# Patient Record
Sex: FEMALE | Race: BLACK OR AFRICAN AMERICAN | URBAN - METROPOLITAN AREA
[De-identification: names, ages, dates, MRNs, and addresses within clinical notes are randomized per-mention and may not be internally consistent; named-entity substitution may affect disease eponyms.]

---

## 2017-07-11 ENCOUNTER — COMMUNICATION - HEALTHEAST (OUTPATIENT)
Dept: BEHAVIORAL HEALTH | Facility: CLINIC | Age: 32
End: 2017-07-11

## 2017-07-11 DIAGNOSIS — F31.9 BIPOLAR AFFECTIVE DISORDER, REMISSION STATUS UNSPECIFIED (H): ICD-10-CM

## 2017-07-13 ENCOUNTER — AMBULATORY - HEALTHEAST (OUTPATIENT)
Dept: BEHAVIORAL HEALTH | Facility: CLINIC | Age: 32
End: 2017-07-13

## 2017-07-13 ENCOUNTER — OFFICE VISIT - HEALTHEAST (OUTPATIENT)
Dept: BEHAVIORAL HEALTH | Facility: CLINIC | Age: 32
End: 2017-07-13

## 2017-07-13 DIAGNOSIS — F11.29 OPIOID DEPENDENCE WITH OPIOID-INDUCED DISORDER (H): ICD-10-CM

## 2017-07-13 DIAGNOSIS — F11.10 OPIATE ABUSE, CONTINUOUS (H): ICD-10-CM

## 2017-07-13 DIAGNOSIS — F31.9 BIPOLAR AFFECTIVE DISORDER, REMISSION STATUS UNSPECIFIED (H): ICD-10-CM

## 2017-07-13 RX ORDER — DIPHENHYDRAMINE HCL 25 MG
25 CAPSULE ORAL EVERY 4 HOURS PRN
Status: SHIPPED | COMMUNITY
Start: 2017-07-13

## 2017-07-13 ASSESSMENT — MIFFLIN-ST. JEOR: SCORE: 1671.98

## 2017-07-21 ENCOUNTER — OFFICE VISIT - HEALTHEAST (OUTPATIENT)
Dept: BEHAVIORAL HEALTH | Facility: CLINIC | Age: 32
End: 2017-07-21

## 2017-07-21 DIAGNOSIS — F11.29 OPIOID DEPENDENCE WITH OPIOID-INDUCED DISORDER (H): ICD-10-CM

## 2017-07-21 DIAGNOSIS — F11.10 OPIATE ABUSE, CONTINUOUS (H): ICD-10-CM

## 2017-07-21 ASSESSMENT — MIFFLIN-ST. JEOR: SCORE: 1653.84

## 2017-07-27 ENCOUNTER — OFFICE VISIT - HEALTHEAST (OUTPATIENT)
Dept: BEHAVIORAL HEALTH | Facility: CLINIC | Age: 32
End: 2017-07-27

## 2017-07-27 DIAGNOSIS — F11.29 OPIOID DEPENDENCE WITH OPIOID-INDUCED DISORDER (H): ICD-10-CM

## 2017-07-27 DIAGNOSIS — F11.10 OPIATE ABUSE, CONTINUOUS (H): ICD-10-CM

## 2017-07-27 ASSESSMENT — MIFFLIN-ST. JEOR: SCORE: 1662.91

## 2017-08-02 ENCOUNTER — AMBULATORY - HEALTHEAST (OUTPATIENT)
Dept: BEHAVIORAL HEALTH | Facility: CLINIC | Age: 32
End: 2017-08-02

## 2017-08-02 DIAGNOSIS — F11.29 OPIOID DEPENDENCE WITH OPIOID-INDUCED DISORDER (H): ICD-10-CM

## 2017-08-02 DIAGNOSIS — F11.10 OPIATE ABUSE, CONTINUOUS (H): ICD-10-CM

## 2017-08-02 ASSESSMENT — MIFFLIN-ST. JEOR: SCORE: 1653.84

## 2017-08-07 ENCOUNTER — COMMUNICATION - HEALTHEAST (OUTPATIENT)
Dept: BEHAVIORAL HEALTH | Facility: CLINIC | Age: 32
End: 2017-08-07

## 2017-08-07 DIAGNOSIS — F31.9 BIPOLAR AFFECTIVE DISORDER, REMISSION STATUS UNSPECIFIED (H): ICD-10-CM

## 2017-08-09 ENCOUNTER — AMBULATORY - HEALTHEAST (OUTPATIENT)
Dept: BEHAVIORAL HEALTH | Facility: CLINIC | Age: 32
End: 2017-08-09

## 2017-08-09 DIAGNOSIS — F11.29 OPIOID DEPENDENCE WITH OPIOID-INDUCED DISORDER (H): ICD-10-CM

## 2017-08-09 DIAGNOSIS — F11.10 OPIATE ABUSE, CONTINUOUS (H): ICD-10-CM

## 2017-08-09 ASSESSMENT — MIFFLIN-ST. JEOR: SCORE: 1667.45

## 2017-08-16 ENCOUNTER — COMMUNICATION - HEALTHEAST (OUTPATIENT)
Dept: BEHAVIORAL HEALTH | Facility: CLINIC | Age: 32
End: 2017-08-16

## 2017-08-16 DIAGNOSIS — F31.9 BIPOLAR AFFECTIVE DISORDER, REMISSION STATUS UNSPECIFIED (H): ICD-10-CM

## 2017-08-16 RX ORDER — GABAPENTIN 300 MG/1
CAPSULE ORAL
Qty: 180 CAPSULE | Refills: 0 | Status: SHIPPED | OUTPATIENT
Start: 2017-08-16

## 2017-08-24 ENCOUNTER — AMBULATORY - HEALTHEAST (OUTPATIENT)
Dept: BEHAVIORAL HEALTH | Facility: CLINIC | Age: 32
End: 2017-08-24

## 2017-08-24 DIAGNOSIS — F11.10 OPIATE ABUSE, CONTINUOUS (H): ICD-10-CM

## 2017-08-24 DIAGNOSIS — F11.29 OPIOID DEPENDENCE WITH OPIOID-INDUCED DISORDER (H): ICD-10-CM

## 2017-08-24 RX ORDER — BUPRENORPHINE AND NALOXONE 8; 2 MG/1; MG/1
FILM, SOLUBLE BUCCAL; SUBLINGUAL DAILY
Status: SHIPPED | COMMUNITY
Start: 2017-08-24

## 2017-08-24 ASSESSMENT — MIFFLIN-ST. JEOR: SCORE: 1667.45

## 2017-08-30 ENCOUNTER — COMMUNICATION - HEALTHEAST (OUTPATIENT)
Dept: BEHAVIORAL HEALTH | Facility: CLINIC | Age: 32
End: 2017-08-30

## 2017-08-30 DIAGNOSIS — F31.9 BIPOLAR AFFECTIVE DISORDER, REMISSION STATUS UNSPECIFIED (H): ICD-10-CM

## 2017-08-31 RX ORDER — PAROXETINE 20 MG/1
TABLET, FILM COATED ORAL
Qty: 30 TABLET | Refills: 0 | Status: SHIPPED | OUTPATIENT
Start: 2017-08-31

## 2017-08-31 RX ORDER — TRAZODONE HYDROCHLORIDE 50 MG/1
TABLET, FILM COATED ORAL
Qty: 60 TABLET | Refills: 0 | Status: SHIPPED | OUTPATIENT
Start: 2017-08-31

## 2018-01-21 ENCOUNTER — HEALTH MAINTENANCE LETTER (OUTPATIENT)
Age: 33
End: 2018-01-21

## 2021-05-31 VITALS — HEIGHT: 66 IN | WEIGHT: 215 LBS | BODY MASS INDEX: 34.55 KG/M2

## 2021-05-31 VITALS — WEIGHT: 211 LBS | HEIGHT: 66 IN | BODY MASS INDEX: 33.91 KG/M2

## 2021-05-31 VITALS — BODY MASS INDEX: 33.91 KG/M2 | HEIGHT: 66 IN | WEIGHT: 211 LBS

## 2021-05-31 VITALS — HEIGHT: 66 IN | BODY MASS INDEX: 34.23 KG/M2 | WEIGHT: 213 LBS

## 2021-05-31 VITALS — BODY MASS INDEX: 34.39 KG/M2 | HEIGHT: 66 IN | WEIGHT: 214 LBS

## 2021-05-31 VITALS — HEIGHT: 66 IN | WEIGHT: 214 LBS | BODY MASS INDEX: 34.39 KG/M2

## 2021-06-11 NOTE — PROGRESS NOTES
DAM  Date: 17  Results: neg  BUP   Date: NA   Results: none    Patient was discharged from inpatient on .  This was her 5th time trough treatment.   She is set up to go to [facility] on     Working or school: no    Concerns today: Patient states that she would like a dose adjustment of her Suboxone as it doesn't seem to hold her the whole day.    [place] :    [employer] [place] Date: 17  Query Report Page#: 1  Patient Rx History Report  EDER DUARTE  Search Criteria: Last Name 'eder' and First Name 'brittany' and  = '[birth year]' and Request Period =   to ' - 3 out of 3 Recipients Selected.  Fill Date Product, Str, Form Qty Days Pt ID Prescriber Written RX# N/R* Pharm **MED+  ---------- -------------------------------- ------ ---- --------- ---------- ---------- ------------ ----- --------- ------  2017 GABAPENTIN 300 MG CAPSULE 180.00 30 [id number] 2017 [id number] N [id number] 00.0  2017 SUBOXONE 2 MG-0.5 MG SL FILM 30.00 5 [id number] 2017 [id number] N [id number] 360.0  2017 GABAPENTIN 300 MG CAPSULE 112.00 28 [id number] 2017 [id number] N [id number] 00.0  2017 GABAPENTIN 300 MG CAPSULE 120.00 30 [id number] 2017 [id number] N [id number] 00.0  *N/R N=New R=Refill  +MED Daily  Prescribers for prescriptions listed  ----------------------------------------------------------------------------------------------------------------------------------  [id number] KIRTI ARAYA MD; [facility], [address] [place] [address], [address], [facility] [address]  [id number] TAWANA MRATINEZ (MS, CNP); TAWANA COMER [facility], [facility] [address]  [id number] ROD KHAN (MSN); NP-C, [address] [place] [address], [address], [facility] [address]  [id number] HARRY BERGER; LIMITED TO [employer]IE, [facility], [address],  Pharmacies that  dispensed prescriptions listed  ----------------------------------------------------------------------------------------------------------------------------------  [id number] [facility]; [address] [facility], [address], [facility] [address],  [id number] [facility]; [facility] [place] [address],  [id number] [employer]; [address] [facility], [address], [facility] [address],    Correct pharmacy verified with patient and confirmed in snapshot? [x] yes []no    Medications Phoned  to Pharmacy [] yes [x]no  Name of Pharmacist:  List Medications, including dose, quantity and instructions      Medication Prescriptions given to patient   [] yes  [x] no   List the name of the drug the prescription was written for.       Medications ordered this visit were e-scribed.  Verified by order class [x] yes  [] no    Medication changes or discontinuations were communicated to patient's pharmacy: [] yes  [x] no    UA collected [x] yes    [] no    [place] Prescription Monitoring Program Reviewed? [x] yes  [] no    Referrals were made to:  none    Future appointment was made: [x] yes  [] no    Dictation completed at time of chart check: [] yes  [x] no    I have checked the documentation for today s encounters and the above information has been reviewed and completed.

## 2021-06-12 NOTE — PROGRESS NOTES
"Addiction  Nurse Only Visit Note    8/9/2017  Date of last visit: 8/2/17    Reason for today's visit:   Chief Complaint   Patient presents with     Addiction     nurse only for Dr. Mackenzie       There were no vitals filed for this visit.     If having pain, who will address pain:  NA    Is pt assisted: No    Urine for toxicology sent today: yes    Last UA date: 8/2/17  Reviewed with patient: yes  Results: negative  BUP:  7/13/17 was positive, next due 10/2017    Pill count: No  (Controlled substance only)  Medications needing renewal:  suboxone 8-2mg film. 1 and 1/2 qam and one film qhs, #18, with no refills called to the pharmacy spoke with Urbano    Substance use history:    Using alcohol:No  Using street drugs or unprescribed medications: No  Using opioids other that Suboxone:No  Using tobacco:Yes: Describe: 6 cigarettes per day    Recovery environment:  Attending formal chemical dependency programming: Yes: Describe: Day outpatient at [facility] 5x week  Attending \"outside\" mutual help meetings: Yes: Describe: 2x week  Has a chemical dependency sponsor: No  Has other elements of structure: No  Current Living Situation: homeless since she came to [place] from [place] last October 2016.  Has moved from place to place.  Currently at [facility]; tomorrow 8/10 she moves to a sober home at [address] [place] [address].    Cravings: no    Sleep: sleeps, feels tired at times    Depression: no    Anxiety: no    Panic Attacks: no    Paranoia: no      Hallucinations: no      Suicidal Ideation: no    Homicidal Ideation:no     Comments:  reviewed and embedded.                      Next appointments are:  8/16  Dr. Mackenzie    8/24  Mallory Kwon CNP   And   8/29  Dr. Brunner    Physical Problems: Hep C needs to see a specialist.  Will see her PCP on 8/21, will get a referral at that time.                                   Will request from PCP re-starting Depoprovera. She missed her appointment in July.        Education Done " Today  Patient Instructions:   Patient Instructions   Continue Medications as ordered.  No alcohol or unprescribed drug use.  No driving, if sedated.  Come to the Emergency Room if not feeling safe.  Call the clinic with any questions [phone].  If you identify that you are pregnant, please call us to inform us, as medications may need to be changed.  You can also contact Urgent Care Crisis Line at [phone] 24 hours a day for help.  Follow up as directed, for your appointments, per your After Visit Summary Form.        Stefanie Garcia    2017 1:44 PM    [employer] [place] Date: 17  Query Report Page#: 1  Patient Rx History Report  MARIANGEL DUARTE  Search Criteria: Last Name 'Mariangel' and First Name 'Keisha' and  = '[birth year]' and Request Period = '  to ' - 3 out of 3 Recipients Selected.  Fill Date Product, Str, Form Qty Days Pt ID Prescriber Written RX# N/R* Pharm **MED+  ---------- -------------------------------- ------ ---- --------- ---------- ---------- ------------ ----- --------- ------  2017 SUBOXONE 8 MG-2 MG SL FILM 8.00 3 [id number] 2017 [id number] N [id number] 640.0  2017 SUBOXONE 8 MG-2 MG SL FILM 15.00 10 [id number] 2017 [id number] N [id number] 360.0  2017 SUBOXONE 8 MG-2 MG SL FILM 15.00 6 [id number] 2017 [id number] N [id number] 600.0  2017 GABAPENTIN 300 MG CAPSULE 180.00 30 [id number] 2017 [id number] N [id number] 00.0  2017 SUBOXONE 8 MG-2 MG SL FILM 20.00 8 [id number] 2017 [id number] N [id number] 600.0  2017 SUBOXONE 8 MG-2 MG SL FILM 6.00 3 [id number] 2017 [id number] N [id number] 480.0  2017 GABAPENTIN 300 MG CAPSULE 60.00 10 [id number] 2017 [id number] N [id number] 00.0  *N/R N=New R=Refill  +MED Daily  Prescribers for prescriptions  listed  ----------------------------------------------------------------------------------------------------------------------------------  [id number] JACQUIJEREMY-AIMEESARITAMINMICHAELLE; [address] [facility] АННА, [facility] [address]  [id number] ESTELLA GERBER MD; [facility], [address], [place] [address]  Pharmacies that dispensed prescriptions listed  ----------------------------------------------------------------------------------------------------------------------------------  [id number] [facility] : [facility] # [id number], [address] [facility] [address],  Patients that match search criteria  ----------------------------------------------------------------------------------------------------------------------------------  [id number] AVEL MONTERROSO [birth year]; [address], [place] [address]  [id number] DEREK DUARTE VANESSA,  [birth year]; [address] [facility] [address]  [id number] DEREK ELDERA,  [birth year]; [address] [place] [address]  MED Summary  This section displays cumulative MED values by unique recipient. The MED Max value is the maximum occurrence of cumulative MED  sustained for any 3 consecutive days. This value is calculated based on prescriptions dispensed during the date range requested.  -----------------------------------------------------------------------------------------------------------------------------------  [address]ED; 1985; [address] [facility] [address]

## 2021-06-12 NOTE — PROGRESS NOTES
"Addiction  Nurse Only Visit Note    8/2/2017  Date of last visit: 7/27/17    Reason for today's visit:   Chief Complaint   Patient presents with     Follow-up       Vitals:    08/02/17 1307   BP: 130/83   Patient Site: Right Arm   Patient Position: Sitting   Cuff Size: Adult Regular   Pulse: 80   Resp: 18   Temp: 98.8  F (37.1  C)   TempSrc: Oral   Weight: 211 lb (95.7 kg)   Height: 5' 6\" (1.676 m)        If having pain, who will address pain:  none    Is pt assisted: No    Urine for toxicology sent today: yes    Last UA date: 7/26/17  Reviewed with patient: yes  Results: negative    AIMS:     Pill count: No  (Controlled substance only)  Medications needing renewal: See meds and orders    Substance use history:    Using alcohol:No  Using street drugs or unprescribed medications: No  Using opioids other that Suboxone:No  Using tobacco:Yes: Describe: 6 cigarettes a day    Recovery environment:  Attending formal chemical dependency programming: Yes: Describe: [facility] 5 days a week for outpatient CD treatment  Attending \"outside\" mutual help meetings: No  Has a chemical dependency sponsor: No  Has other elements of structure: No  Current Living Situation: Living at [facility]     Cravings: no  Sleep: no    Depression: no    Anxiety: yes \"just normal anxiety, nothing I can't handle\".    Panic Attacks: no    Paranoia: no      Hallucinations: no      Suicidal Ideation: no    Homicidal Ideation:no  Comments: none    Physical Problems: no    Patient Update: Patient represented for Nurse Only appointment on-time, well groomed with a bright affect. Patient reported the Suboxone working well, no cravings. Patient currently participating in outpatient CD treatment at [facility] 5 days a week. She stated she has not participated in any NA/AA meetings, but plans to in the near future. Patient denied and depression, SI or HI. She endorsed a little anxiety, but stated it's \"nothing I can't handle\". Patient currently residing at " [facility], but is working towards getting into an apartment. No concerns for Dr. Mackenzie    Next appointment with Nurse Only on 17    Per Dr. Mackenzie directives, phoned in Suboxone 8-2 mg SL film. 12 mg in am and 8 mg at hs. #8 Refill 0. Patient said she had enough Suboxone from her last fill to get her to , 17. She brought her Suboxone with her and it was verified by this writer that she did have enough through 17. Today's order will cover patient from 17 through 17. Patient reported she has been taking her Suboxone as directed Spoke pharmacist Urbano at [facility]. Order read back for accuracy.     [place]  reviewed prior to Nurse Only appointment and was embedded in this encounter.     [employer] [place] Date: 17  Query Report Page#: 1  Patient Rx History Report  MARIANGEL DUARTE  Search Criteria: Last Name 'Mariangel' and First Name 'Keisha' and  = '[birth year]' and Request Period = '17'  to ' - 2 out of 2 Recipients Selected.  Fill Date Product, Str, Form Qty Days Pt ID Prescriber Written RX# N/R* Pharm **MED+  ---------- -------------------------------- ------ ---- --------- ---------- ---------- ------------ ----- --------- ------  2017 SUBOXONE 8 MG-2 MG SL FILM 15.00 10 [id number] 2017 [id number] N [id number] 360.0  2017 SUBOXONE 8 MG-2 MG SL FILM 15.00 6 [id number] 2017 [id number] N [id number] 600.0  2017 GABAPENTIN 300 MG CAPSULE 180.00 30 [id number] 2017 [id number] N [id number] 00.0  2017 SUBOXONE 8 MG-2 MG SL FILM 20.00 8 [id number] 2017 [id number] N [id number] 600.0  2017 SUBOXONE 8 MG-2 MG SL FILM 6.00 3 [id number] 2017 [id number] N [id number] 480.0  2017 GABAPENTIN 300 MG CAPSULE 60.00 10 [id number] 2017 [id number] N [id number] 00.0  *N/R N=New R=Refill  +MED Daily  Prescribers for prescriptions  listed  ----------------------------------------------------------------------------------------------------------------------------------  [id number] JACQUIJEREMY-AIMEESARITAMINMICHAELLE; [address] [facility] АННА, [facility] [address]  [id number] ESTELLA GERBER MD; [facility], [address], [place] [address]  Pharmacies that dispensed prescriptions listed  ----------------------------------------------------------------------------------------------------------------------------------  [id number] [facility] : FELICITAS # [id number], [address] [facility] [address],  Patients that match search criteria  ----------------------------------------------------------------------------------------------------------------------------------  [id number] AVEL MONTERROSO [birth year]; [facility], [place] [address]  [id number] DEREK DUARTE,  [birth year]; [address] [place] [address]  MED Summary  This section displays cumulative MED values by unique recipient. The MED Max value is the maximum occurrence of cumulative MED  sustained for any 3 consecutive days. This value is calculated based on prescriptions dispensed during the date range requested.  -----------------------------------------------------------------------------------------------------------------------------------  [address]; 1985; [address] [facility] [address]  **Per CDC guidance, the conversion factors and associated daily morphine milligram equivalents for drugs prescribed as part of  medication-assisted treatment for opioid use disorder should not be used to benchmark against dosage thresholds meant for opioids  prescribed for pain.  Report Disclaimers:  The report provided above is based upon the search criteria and the data provided by the dispensing entities. For more information  about any prescription, please contact the dispenser or the prescriber.  This report contains confidential information, including patient  identifiers, and is not a public record. The information on this  report must be treated as protected health information and is to be disclosed to others only as authorized by applicable state  and Federal regulations.        Education Done Today  Patient Instructions:   Patient Instructions   Continue Medications as ordered.  No alcohol or unprescribed drug use.  No driving, if sedated.  Come to the Emergency Room if not feeling safe.  Call the clinic with any questions [phone].  If you identify that you are pregnant, please call us to inform us, as medications may need to be changed.  You can also contact Urgent Care Crisis Line at [phone] 24 hours a day for help.  Follow up as directed, for your appointments, per your After Visit Summary Form.            David J Myhre    8/2/2017 1:17 PM

## 2021-06-12 NOTE — PROGRESS NOTES
Pt here for follow up and medication management.      Last DAM 17 results: negative  Last BUP 17 results: positive     Left her sober house and now living in a shelter with a locker for her medications.       [place]  below:  [place] Date: 17  Query Report Page#: 1  Patient Rx History Report  EDER DUARTE  Search Criteria: Last Name 'eder' and First Name 'brittany' and  = '[birth year]' and Request Period =   to ' - 3 out of 3 Recipients Selected.  Fill Date Product, Str, Form Qty Days Pt ID Prescriber Written RX# N/R* Pharm **MED+  ---------- -------------------------------- ------ ---- --------- ---------- ---------- ------------ ----- --------- ------  2017 GABAPENTIN 300 MG CAPSULE 180.00 30 [id number] 2017 [id number] N [id number] 00.0  2017 SUBOXONE 8 MG-2 MG SL FILM 20.00 8 [id number] 2017 [id number] N [id number] 600.0  2017 SUBOXONE 8 MG-2 MG SL FILM 6.00 3 [id number] 2017 [id number] N [id number] 480.0  2017 GABAPENTIN 300 MG CAPSULE 60.00 10 [id number] 2017 [id number] N [id number] 00.0  2017 GABAPENTIN 300 MG CAPSULE 180.00 30 [id number] 2017 [id number] N [id number] 00.0  2017 SUBOXONE 2 MG-0.5 MG SL FILM 30.00 5 [id number] 2017 [id number] N [id number] 360.0  2017 GABAPENTIN 300 MG CAPSULE 112.00 28 [id number] 2017 [id number] N [id number] 00.0  2017 GABAPENTIN 300 MG CAPSULE 120.00 30 [id number] 2017 [id number] N [id number] 00.0  *N/R N=New R=Refill  +MED Daily  Prescribers for prescriptions listed  ----------------------------------------------------------------------------------------------------------------------------------  [id number] KIRTI ARAYA MD; [facility], [address] [place] [address], [place] [address], [facility] [address]  [id number] TAWANA MARTINEZ (MS, CNP); TAWANA COMER [facility], [facility]  [facility] [address]  [id number] ROD KHAN (MSN); NP-C, [address] [place] [address]  [id number] HARRY BERGER; LIMITED TO [employer], [facility], [address],  [id number] DABJEREMY-AIMEE, ROSALVAA; [address] [facility] [address]  [id number] ESTELLA GERBER MD; [facility], [address], [place] [address]  Pharmacies that dispensed prescriptions listed  ----------------------------------------------------------------------------------------------------------------------------------  [id number] [employer] : [facility] # [id number], [address] [facility] [address],  [id number] [facility]; [address] [facility], [address], [facility] [address],  [id number] [facility] [place]; [address] [facility] [place] [address],  [id number] [employer]; [address] [facility], [address], [facility] [address],  Patients that match search criteria  ----------------------------------------------------------------------------------------------------------------------------------  [id number] DEREK DUARTE,  [birth year]; [address], [place] [address]  [id number] DEREK MENDEZ,  [birth year]; [address] [facility] [address]  [id number] DEREK DUARTE,  [birth year]; [address] [facility] [address]  MED Summary  This section displays cumulative MED values by unique recipient. The MED Max value is the maximum occurrence of cumulative MED  sustained for any 3 consecutive days. This value is calculated based on prescriptions dispensed during the date range requested.  -----------------------------------------------------------------------------------------------------------------------------------  **Per CDC guidance, the conversion factors and associated daily morphine milligram equivalents for drugs prescribed as part of  medication-assisted treatment for opioid use disorder should not be  used to benchmark against dosage thresholds meant for opioids  prescribed for pain.  [employer] [place] Date: 07/21/17  Query Report Page#: 2  Patient Rx History Report  DEREK DUARTE  [address]; 1985; [facility] [place] [address]  0 GERAL[address] REED; 1985; [address] [facility] [address]  [address]; 1985; [address] [address]  **

## 2021-06-12 NOTE — PROGRESS NOTES
Pt here for follow up and medication management.    Last DAM 17 results: negative  Last BUP 17 results: positive       Pt would like to transfer to a female provider due to past trauma experiences.  Pt also would like referral for mental health for her depression and anxiety.  Attending her op program  4 hours daily and going twice a week meetings.  Still looking for sponsor that has completed all 12 steps with years of soberity.    Denies SI/HI thoughts at this time.       [place]  below:   [employer] [place] Date: 17  Query Report Page#: 1  Patient Rx History Report  ANALIA DUARTE  Search Criteria: Last Name 'Analia' and First Name 'Keisha' and  = '[birth year]' and Request Period = '  to ' - 3 out of 3 Recipients Selected.  Fill Date Product, Str, Form Qty Days Pt ID Prescriber Written RX# N/R* Pharm **MED+  ---------- -------------------------------- ------ ---- --------- ---------- ---------- ------------ ----- --------- ------  2017 SUBOXONE 8 MG-2 MG SL FILM 15.00 6 [id number] 2017 [id number] N [id number] 600.0  2017 GABAPENTIN 300 MG CAPSULE 180.00 30 [id number] 2017 [id number] N [id number] 00.0  2017 SUBOXONE 8 MG-2 MG SL FILM 20.00 8 [id number] 2017 [id number] N [id number] 600.0  2017 SUBOXONE 8 MG-2 MG SL FILM 6.00 3 [id number] 2017 [id number] N [id number] 480.0  2017 GABAPENTIN 300 MG CAPSULE 60.00 10 [id number] 2017 [id number] N [id number] 00.0  2017 GABAPENTIN 300 MG CAPSULE 180.00 30 [id number] 2017 [id number] N [id number] 00.0  2017 SUBOXONE 2 MG-0.5 MG SL FILM 30.00 5 [id number] 2017 [id number] N [id number] 360.0  2017 GABAPENTIN 300 MG CAPSULE 112.00 28 [id number] 2017 [id number] N [id number] 00.0  2017 GABAPENTIN 300 MG CAPSULE 120.00 30 [id number] 2017 [id number] N [id number] 00.0  *N/R N=New  R=Refill  +MED Daily  Prescribers for prescriptions listed  ----------------------------------------------------------------------------------------------------------------------------------  [id number] KIRTI ARAYA MD; [facility], [address] [place] [address], [address], [facility] [address]  [id number] TAWANA MARTINEZ (MS, CNP); TAWANA MARTINEZ APRLIZBETH [facility], [address] [place] [address]  [id number] ROD KHAN (MSN); NP-C, [address] [place] [address], [address], [facility] [address]  [id number] HARRY BERGER; LIMITED TO [facility] [employer]OYEE [employer]IE, [facility], [address],  [id number] IRLANDA-TOMEKA YU; [address] [facility] [address]  [id number] ESTELLA GERBER MD; [facility], [address], [place] [address]  Pharmacies that dispensed prescriptions listed  ----------------------------------------------------------------------------------------------------------------------------------  [id number] [employer] : [facility] [address] [id number], [address] [facility] [address],  [id number] [facility]; [address] [facility], [address], [facility] [address],  [id number] [facility]; [address] [facility] [place] [address],  [id number] [employer]; [address] [facility], [address], [facility] [address],  Patients that match search criteria  ----------------------------------------------------------------------------------------------------------------------------------  [id number] DEREK DUARTE  [birth year]; [place], [place] [address]  [id number] DEREK MENDEZ  [birth year]; [address] [facility] [address]  [id number] DEREK DUARTE  [birth year]; [address] [address]  MED Summary  This section displays cumulative MED values by unique recipient. The MED Max value is the maximum occurrence of cumulative MED  sustained for any 3 consecutive days. This value is calculated based  on prescriptions dispensed during the date range requested.  **Per CDC guidance, the conversion factors and associated daily morphine milligram equivalents for drugs prescribed as part of  medication-assisted treatment for opioid use disorder should not be used to benchmark against dosage thresholds meant for opioids  prescribed for pain.  [employer] [place] Date: 07/26/17  Query Report Page#: 2  Patient Rx History Report  DEREK DUARTE  -----------------------------------------------------------------------------------------------------------------------------------  [address]; 1985; [place], [place] [address]  [address]; 1985; [address] [facility] [address]  [address]; 1985; [address] [place] [address]  **Per CDC guidance, the conversion factors and associated daily morphine milligram equivalents for drugs prescribed as part of  medication-assisted treatment for opioid use disorder should not be used to benchmark against dosage thresholds meant for opioids  prescribed for pain.  Report Disclaimers:  The report provided above is based upon the search criteria and the data provided by the dispensing entities. For more information  about any prescription, please contact the dispenser or the prescriber.  This report contains confidential information, including patient identifiers, and is not a public record. The information on this  report must be treated as protected health information and is to be disclosed to others only as authorized by applicable state  and Federal regulations.

## 2021-06-12 NOTE — PROGRESS NOTES
Correct pharmacy verified with patient and confirmed in snapshot? [x] yes []no    Medications Phoned  to Pharmacy [] yes [x]no  Name of Pharmacist:  List Medications, including dose, quantity and instructions      Medication Prescriptions given to patient   [] yes  [x] no   List the name of the drug the prescription was written for.       Medications ordered this visit were e-scribed.  Verified by order class [x] yes  [] no   Suboxone  Medication changes or discontinuations were communicated to patient's pharmacy: [] yes  [x] no    UA collected [x] yes    [] no    [place] Prescription Monitoring Program Reviewed? [x] yes  [] no    Referrals were made to:  none    Future appointment was made: [x] yes  [] no    Dictation completed at time of chart check: [x] yes  [] no    I have checked the documentation for today s encounters and the above information has been reviewed and completed.

## 2021-06-12 NOTE — PROGRESS NOTES
Correct pharmacy verified with patient and confirmed in snapshot? [x] yes []no    Medications Phoned  to Pharmacy [] yes [x]no  Name of Pharmacist:  List Medications, including dose, quantity and instructions      Medication Prescriptions given to patient   [] yes  [x] no   List the name of the drug the prescription was written for.       Medications ordered this visit were e-scribed.  Verified by order class [x] yes  [] no  Suboxone   Medication changes or discontinuations were communicated to patient's pharmacy: [] yes  [x] no    UA collected [x] yes    [] no    [place] Prescription Monitoring Program Reviewed? [x] yes  [] no    Referrals were made to:  Psychiatry     Future appointment was made: [x] yes  [] no    Dictation completed at time of chart check: [x] yes  [] no    I have checked the documentation for today s encounters and the above information has been reviewed and completed.

## 2021-06-12 NOTE — PROGRESS NOTES
"Addiction  Nurse Only Visit Note    8/24/2017  Date of last visit: 8/9/17    Reason for today's visit:   Chief Complaint   Patient presents with     Follow-up       Vitals:    08/24/17 1329   BP: 147/84   Patient Site: Right Arm   Patient Position: Sitting   Cuff Size: Adult Regular   Pulse: 76   Temp: 98.5  F (36.9  C)   TempSrc: Oral   Weight: 214 lb (97.1 kg)   Height: 5' 6\" (1.676 m)        If having pain, who will address pain:  No pain    Is pt assisted: No    Urine for toxicology sent today: yes    Last UA date: 8/9/17  Reviewed with patient: yes  Results: DAM neg    AIMS:     Pill count: NA  (Controlled substance only)  Medications needing renewal: see note    Substance use history:    Using alcohol:No  Using street drugs or unprescribed medications: Yes: Describe: THC this morning, was out of suboxone  Using opioids other that Suboxone:No  Using tobacco:Yes: Describe: 1/2 day    Recovery environment:  Attending formal chemical dependency programming: Yes: Describe: [facility] 5 days weekly  Attending \"outside\" mutual help meetings: Yes: Describe: 2 meetings weekly  Has a chemical dependency sponsor: No  Has other elements of structure: Yes: Describe: waiting for shelter bed, homeless  Current Living Situation: homeless    Cravings: no    Sleep: no    Depression: yes 9/10    Anxiety: yes 9/10    Panic Attacks: no    Paranoia: yes just getting my stuff stolen, I'm not sure that paranoia because it can happen    Hallucinations: no    Suicidal Ideation: no    Homicidal Ideation:no  Comments: none    Physical Problems: no, getting Depo today and seeing PMD today at 2:30pm    [place]  was reviewed prior to seeing patient today. See below for embedded report.    Note: Accompanied by friend, patient begin appt by saying she had \"smoked weed\" this morning, she had lost her housing this morning and had been without her suboxone for two days. Currently is homeless, plans on going to shelter. She is doing [employer] [facility] CD " treatment 5 days a week, attends meetings 2 times weekly, no sponsor yet. She is seeing PMD this afternoon, getting Depo injection.     Suboxone 8/2 mg sl film, takes 2.5 daily, #15 0-RF, called to pharmacy, spoke with pharmacist Tao, order read back for accuracy. Next appt  with Dr. Brunner.      Patient Instructions   Continue Medications as ordered.  No alcohol or unprescribed drug use.  No driving, if sedated.  Come to the Emergency Room if not feeling safe.  Call the clinic with any questions [phone].  Follow up as directed, for your appointments, per your After Visit Summary Form.      Call the clinic if any concerns: [facility] [phone]  [facility] [phone]  and Report to the emergency room if you feel like harming yourself or others or are psychotic        Paula GARCIA Arnulfo    2017 1:32 PM    DEREK DUARTE  Search Criteria: Last Name 'gaines' and First Name 'brittany' and  = '[birth year]' and Request Period = '  to ' - 3 out of 3 Recipients Selected.  Fill Date Product, Str, Form Qty Days Pt ID Prescriber Written RX# N/R* Pharm **MED+  ---------- -------------------------------- ------ ---- --------- ---------- ---------- ------------ ----- --------- ------  2017 SUBOXONE 8 MG-2 MG SL FILM 18.00 8 [id number] 2017 [id number] N [id number] 540.0  2017 SUBOXONE 8 MG-2 MG SL FILM 8.00 3 [id number] 2017 [id number] N [id number] 640.0  2017 SUBOXONE 8 MG-2 MG SL FILM 15.00 10 [id number] 2017 [id number] N [id number] 360.0  2017 SUBOXONE 8 MG-2 MG SL FILM 15.00 6 [id number] 2017 [id number] N [id number] 600.0  2017 GABAPENTIN 300 MG CAPSULE 180.00 30 [id number] 2017 [id number] N [id number] 00.0  2017 SUBOXONE 8 MG-2 MG SL FILM 20.00 8 [id number] 2017 [id number] N [id number] 600.0  2017 SUBOXONE 8 MG-2 MG SL FILM 6.00 3 [id number] 2017 [id number] N [id number]  480.0  2017 GABAPENTIN 300 MG CAPSULE 60.00 10 [id number] 2017 [id number] N [id number] 00.0  *N/R N=New R=Refill  +MED Daily  Prescribers for prescriptions listed  ----------------------------------------------------------------------------------------------------------------------------------  [id number] TOMEKA HATCH; [address] [facility] [address]  [id number] ESTELLA GERBER MD; [facility], [address], [place] [address]  Pharmacies that dispensed prescriptions listed  ----------------------------------------------------------------------------------------------------------------------------------  [id number] [facility]; : [facility] # [id number], [address] [facility] [address],  Patients that match search criteria  ----------------------------------------------------------------------------------------------------------------------------------  [id number] DEREK DUARTE,  [birth year]; [address], [place] [address]  [id number] DEREK DUARTE VANESSA,  [birth year]; [address] [facility] [address]  [id number] DEREK ELDERA,  [birth year]; [address] [place] [address]  MED Summary  This section displays cumulative MED values by unique recipient. The MED Max value is the maximum occurrence of cumulative MED  sustained for any 3 consecutive days. This value is calculated based on prescriptions dispensed during the date range requested.  -----------------------------------------------------------------------------------------------------------------------------------  [address]; 1985; [address] [facility] [address]

## 2021-06-16 PROBLEM — F15.20 METHAMPHETAMINE DEPENDENCE (H): Status: ACTIVE | Noted: 2017-06-23

## 2021-06-16 PROBLEM — B18.2 CHRONIC HEPATITIS C WITHOUT HEPATIC COMA (H): Status: ACTIVE | Noted: 2017-06-26

## 2021-06-16 PROBLEM — F11.29 OPIOID DEPENDENCE WITH OPIOID-INDUCED DISORDER (H): Status: ACTIVE | Noted: 2017-06-26

## 2021-06-16 PROBLEM — F17.213 CIGARETTE NICOTINE DEPENDENCE WITH WITHDRAWAL: Status: ACTIVE | Noted: 2017-06-26

## 2021-07-03 NOTE — ADDENDUM NOTE
Addendum Note by Belen Jerome CMA at 7/27/2017  2:21 PM     Author: Belen Jerome CMA Service: Addiction Care Author Type: Certified Medical Assistant    Filed: 7/27/2017  2:21 PM Encounter Date: 7/27/2017 Status: Signed    : Belen Jerome CMA (Certified Medical Assistant)    Addended by: BELEN JEROME on: 7/27/2017 02:21 PM        Modules accepted: Orders

## 2021-07-03 NOTE — ADDENDUM NOTE
Addendum Note by Estella Mackenzie MD at 7/27/2017  3:21 PM     Author: Estella Mackenzie MD Service: -- Author Type: Physician    Filed: 7/27/2017  3:21 PM Encounter Date: 7/27/2017 Status: Signed    : Estella Mackenzie MD (Physician)    Addended by: ESTELLA MACKENZIE on: 7/27/2017 03:21 PM        Modules accepted: Orders

## 2021-07-14 PROBLEM — F29 PSYCHOSIS (H): Status: RESOLVED | Noted: 2017-06-08 | Resolved: 2017-06-26
